# Patient Record
Sex: MALE | Race: OTHER | HISPANIC OR LATINO | Employment: FULL TIME | URBAN - METROPOLITAN AREA
[De-identification: names, ages, dates, MRNs, and addresses within clinical notes are randomized per-mention and may not be internally consistent; named-entity substitution may affect disease eponyms.]

---

## 2017-08-26 ENCOUNTER — HOSPITAL ENCOUNTER (EMERGENCY)
Facility: HOSPITAL | Age: 22
Discharge: HOME | End: 2017-08-26
Attending: EMERGENCY MEDICINE | Admitting: EMERGENCY MEDICINE
Payer: COMMERCIAL

## 2017-08-26 ENCOUNTER — APPOINTMENT (EMERGENCY)
Dept: RADIOLOGY | Facility: HOSPITAL | Age: 22
End: 2017-08-26
Payer: COMMERCIAL

## 2017-08-26 VITALS
OXYGEN SATURATION: 98 % | HEART RATE: 63 BPM | TEMPERATURE: 98.1 F | BODY MASS INDEX: 22.38 KG/M2 | SYSTOLIC BLOOD PRESSURE: 137 MMHG | RESPIRATION RATE: 18 BRPM | WEIGHT: 156 LBS | DIASTOLIC BLOOD PRESSURE: 62 MMHG

## 2017-08-26 DIAGNOSIS — M25.541 METACARPOPHALANGEAL JOINT PAIN OF RIGHT HAND: ICD-10-CM

## 2017-08-26 DIAGNOSIS — S69.91XA INJURY OF RIGHT HAND, INITIAL ENCOUNTER: Primary | ICD-10-CM

## 2017-08-26 DIAGNOSIS — S66.811A: ICD-10-CM

## 2017-08-26 PROCEDURE — 99283 EMERGENCY DEPT VISIT LOW MDM: CPT

## 2017-08-26 PROCEDURE — 73130 X-RAY EXAM OF HAND: CPT | Performed by: EMERGENCY MEDICINE

## 2017-08-26 PROCEDURE — 90715 TDAP VACCINE 7 YRS/> IM: CPT | Performed by: EMERGENCY MEDICINE

## 2017-08-26 PROCEDURE — 90471 IMMUNIZATION ADMIN: CPT

## 2017-08-26 RX ORDER — ACETAMINOPHEN 325 MG/1
650 TABLET ORAL ONCE
Status: COMPLETED | OUTPATIENT
Start: 2017-08-26 | End: 2017-08-26

## 2017-08-26 RX ORDER — IBUPROFEN 400 MG/1
400 TABLET ORAL ONCE
Status: COMPLETED | OUTPATIENT
Start: 2017-08-26 | End: 2017-08-26

## 2017-08-26 RX ADMIN — IBUPROFEN 400 MG: 400 TABLET, FILM COATED ORAL at 18:34

## 2017-08-26 RX ADMIN — ACETAMINOPHEN 650 MG: 325 TABLET, FILM COATED ORAL at 16:16

## 2017-08-26 RX ADMIN — TETANUS TOXOID, REDUCED DIPHTHERIA TOXOID AND ACELLULAR PERTUSSIS VACCINE, ADSORBED 0.5 ML: 5; 2.5; 8; 8; 2.5 SUSPENSION INTRAMUSCULAR at 18:33

## 2017-08-26 NOTE — ED PROVIDER NOTES
History  Chief Complaint   Patient presents with    Hand Injury     right hand pain; punched TV yesterday     17yo male presents to ED c/o right hand pain  Pt says he punched a tv around 2am and woke up with severe hand pain and swelling  Pt notes he is unable to make a tight fist or completely open his hand  Pt denies any wrist pain  Prior tetanus administration unknown  No pmhx  Current every day smoker, 2-3 cigs/day            None       History reviewed  No pertinent past medical history  Past Surgical History:   Procedure Laterality Date    APPENDECTOMY         History reviewed  No pertinent family history  I have reviewed and agree with the history as documented  Social History   Substance Use Topics    Smoking status: Current Every Day Smoker    Smokeless tobacco: Not on file    Alcohol use Yes      Comment: rare        Review of Systems   Constitutional: Negative for chills, diaphoresis and fever  Eyes: Negative for photophobia and visual disturbance  Respiratory: Negative for cough, chest tightness and shortness of breath  Cardiovascular: Negative for chest pain and palpitations  Gastrointestinal: Negative for abdominal pain, nausea and vomiting  Genitourinary: Negative for dysuria and frequency  Musculoskeletal: Positive for joint swelling  Negative for back pain  Swelling near 4th and 5th knuckles of right hand   Skin: Positive for color change  Negative for wound  Neurological: Negative for weakness, numbness and headaches  Hematological: Negative for adenopathy  Does not bruise/bleed easily  All other systems reviewed and are negative        Physical Exam  ED Triage Vitals [08/26/17 1613]   Temperature Pulse Respirations Blood Pressure SpO2   98 1 °F (36 7 °C) 63 18 137/62 98 %      Temp Source Heart Rate Source Patient Position BP Location FiO2 (%)   Tympanic -- -- -- --      Pain Score       3           Physical Exam   Constitutional: He is oriented to person, place, and time  He appears well-developed and well-nourished  No distress  HENT:   Head: Normocephalic and atraumatic  Eyes: EOM are normal  Pupils are equal, round, and reactive to light  Neck: Normal range of motion  Neck supple  Cardiovascular: Normal rate, regular rhythm, normal heart sounds and intact distal pulses  Pulmonary/Chest: Effort normal and breath sounds normal    Abdominal: Soft  Bowel sounds are normal    Musculoskeletal: He exhibits edema and tenderness  Bump with 3mm skin tear on 4th knuckle  Edema and slight erythema of 4th and 5th metacarpophalangeal dorsal region of right hand with  moderate tenderness on dorsal aspect on palpation   Decreased right hand strength d/t pain  Limited extensions and flexion or 4th and 5th digits  Unable to make wrist or extend hand fully d/t pain  Sensation and radial pulse intact   Neurological: He is alert and oriented to person, place, and time  Skin: Skin is warm and dry  Capillary refill takes less than 2 seconds  He is not diaphoretic  Psychiatric: He has a normal mood and affect  His behavior is normal  Judgment and thought content normal    Nursing note and vitals reviewed        ED Medications  Medications   acetaminophen (TYLENOL) tablet 650 mg (650 mg Oral Given 8/26/17 1616)   tetanus-diphtheria-acellular pertussis (BOOSTRIX) IM injection 0 5 mL (0 5 mL Intramuscular Given 8/26/17 1833)   ibuprofen (MOTRIN) tablet 400 mg (400 mg Oral Given 8/26/17 1834)       Diagnostic Studies  Labs Reviewed - No data to display    XR hand 3+ views RIGHT    (Results Pending)       Procedures  Procedures      Phone Consults  ED Phone Contact    ED Course  ED Course   Oscar lAex's Documentation   Comment Time   Ulnar gutter spint to right wrist/hand applied 08/26 1843                             [facility]  Number of Diagnoses or Management Options  Diagnosis management comments: Impression: 17yo male presents to ED for evaluation of right hand injury Ddx: boxer's fx, musculoskeletal pain   Plan: pain control, right hand, wrist Xr, splint    CritCare Time    Disposition  Final diagnoses:   Injury of right hand, initial encounter   Metacarpophalangeal joint pain of right hand   Strain, MCP, hand, right, initial encounter     ED Disposition     ED Disposition Condition Comment    Discharge  [facility] discharge to home/self care  Condition at discharge: Good        Follow-up Information     Follow up With Specialties Details Why Contact Info    Melissa Herzog MD Internal Medicine In 1 week As needed, If symptoms worsen [facility]  [address]          There are no discharge medications for this patient  No discharge procedures on file  ED Provider  Attending physically available and evaluated [facility]  I managed the patient along with the ED Attending      Electronically Signed by       Oniel Almanzar MD  Resident  08/27/17 5480

## 2017-08-26 NOTE — DISCHARGE INSTRUCTIONS
Finger Sprain   WHAT YOU NEED TO KNOW:   A finger sprain happens when ligaments in your finger or thumb are stretched or torn  Ligaments are the tough tissues that connect bones  Ligaments allow your hands to grasp and pinch  DISCHARGE INSTRUCTIONS:   Return to the emergency department if:   · The skin on your injured finger looks bluish or pale (less color than normal)  · You have new weakness or numbness in your finger or thumb  It may tingle or burn  · You have a splint that you cannot adjust and it feels too tight  Contact your healthcare provider if:   · You have new or increased swelling or pain in your finger  · You have new or increased stiffness when you move your injured finger  · You have questions or concerns about your injury or treatment  Medicines:   · Pain medicine  may be given  Do not wait until the pain is severe before taking your medicine  · Take your medicine as directed  Call your healthcare provider if you think your medicines are not helping or if you have side effects  Tell him if you take vitamins, herbs, or any other medicines  Keep a written list of your medicines  Include the amounts, and when and why you take them  Bring the list or the pill bottles to follow-up visits  Care for your finger:   · Rest  your finger for at least 48 hours  Do not do activities that cause pain  Return to normal activities as directed  · Apply ice  on your finger to help decrease pain and swelling  Put crushed ice in a plastic bag and cover it with a towel  Put the ice on your injured finger or thumb every hour for 15 to 20 minutes at a time  You may need to ice the area at least 4 to 8 times each day  Ice your finger for as many days as directed  · Elevate your finger  above the level of your heart as often as you can  This will help decrease swelling and pain  You can elevate your hand by resting it on a pillow  · Use a splint or compression as directed    Compression (tight hold) helps support your finger or thumb as it heals  Tape your injured finger to the finger beside it  Severe sprains may be treated with a splint  A splint prevents your finger from moving while it heals  Ask how long you must wear the splint or tape, and how to apply them  · Do exercises as directed  You may be given gentle exercises to begin in a few days  Exercises can help decrease stiffness in your finger or thumb  Exercises also help decrease pain and swelling and improve the movement of your finger or thumb  Check with your healthcare provider before you return to your normal activities or sports  Follow up with your healthcare provider as directed:  Write down any questions you may have to ask at your follow up visits  © 2017 [address] [employer] Information is for End User's use only and may not be sold, redistributed or otherwise used for commercial purposes  All illustrations and images included in CareNotes® are the copyrighted property of [employer]  or [employer]  The above information is an  only  It is not intended as medical advice for individual conditions or treatments  Talk to your doctor, nurse or pharmacist before following any medical regimen to see if it is safe and effective for you

## 2017-08-27 NOTE — ED PROCEDURE NOTE
Procedure  Static Splint Application  Date/Time: 8/26/2017 6:00 PM  Performed by: Mare Saavedra by: Vu Osei     Patient location:  Bedside  Procedure performed by emergency physician: Yes    Other Assisting Provider: No    Consent:     Consent obtained:  Verbal    Consent given by:  Patient    Risks discussed:  Discoloration, numbness, pain and swelling    Alternatives discussed:  No treatment  Universal protocol:     Procedure explained and questions answered to patient or proxy's satisfaction: yes      Imaging studies available: yes      Patient identity confirmed:  Verbally with patient and arm band  Indication:     Indications: sprain/strain    Pre-procedure details:     Sensation:  Normal  Procedure details:     Laterality:  Right    Location:  Hand    Hand:  R hand    Splint type:  Ulnar gutter    Supplies:  Ortho-Glass  Post-procedure details:     Pain:  Improved    Sensation:  Normal    Neurovascular Exam: capillary refill <2 sec      Patient tolerance of procedure:   Tolerated well, no immediate complications

## 2017-08-29 ENCOUNTER — HOSPITAL ENCOUNTER (EMERGENCY)
Facility: HOSPITAL | Age: 22
Discharge: HOME | End: 2017-08-29
Admitting: EMERGENCY MEDICINE
Payer: COMMERCIAL

## 2017-08-29 VITALS
WEIGHT: 156 LBS | OXYGEN SATURATION: 98 % | DIASTOLIC BLOOD PRESSURE: 76 MMHG | RESPIRATION RATE: 18 BRPM | TEMPERATURE: 98.8 F | SYSTOLIC BLOOD PRESSURE: 152 MMHG | HEART RATE: 65 BPM | HEIGHT: 65 IN | BODY MASS INDEX: 25.99 KG/M2

## 2017-08-29 DIAGNOSIS — L81.9 DISCOLORATION OF SKIN OF HAND: ICD-10-CM

## 2017-08-29 DIAGNOSIS — M79.641 RIGHT HAND PAIN: Primary | ICD-10-CM

## 2017-08-29 PROCEDURE — 99283 EMERGENCY DEPT VISIT LOW MDM: CPT

## 2017-09-06 NOTE — ED ATTENDING ATTESTATION
Nay Hillman MD, saw and evaluated the patient  I have discussed the patient with the resident/non-physician practitioner and agree with the resident's/non-physician practitioner's findings, Plan of Care, and MDM as documented in the resident's/non-physician practitioner's note, except where noted  All available labs and Radiology studies were reviewed  At this point I agree with the current assessment done in the Emergency Department  I have conducted an independent evaluation of this patient a history and physical is as follows:    Patient presents with R had pain after punching a TV last night  No additional injuries or complaints  Exam: AAOX3, NAD, R hand with TTP/swelling over 4th and 5th MCP  A/P: Hand injury  Will check xray to r/o fx      Critical Care Time  CritCare Time

## 2017-10-31 ENCOUNTER — HOSPITAL ENCOUNTER (EMERGENCY)
Facility: HOSPITAL | Age: 22
Discharge: HOME | End: 2017-10-31
Attending: EMERGENCY MEDICINE
Payer: COMMERCIAL

## 2017-10-31 VITALS
BODY MASS INDEX: 25.96 KG/M2 | SYSTOLIC BLOOD PRESSURE: 128 MMHG | WEIGHT: 156 LBS | TEMPERATURE: 102.9 F | DIASTOLIC BLOOD PRESSURE: 82 MMHG | OXYGEN SATURATION: 98 % | HEART RATE: 102 BPM | RESPIRATION RATE: 18 BRPM

## 2017-10-31 DIAGNOSIS — R50.9 FEVER: ICD-10-CM

## 2017-10-31 DIAGNOSIS — J02.0 STREP PHARYNGITIS: Primary | ICD-10-CM

## 2017-10-31 PROCEDURE — 99283 EMERGENCY DEPT VISIT LOW MDM: CPT

## 2017-10-31 RX ORDER — AMOXICILLIN 500 MG/1
500 CAPSULE ORAL EVERY 12 HOURS SCHEDULED
Qty: 20 CAPSULE | Refills: 0 | Status: SHIPPED | OUTPATIENT
Start: 2017-10-31 | End: 2017-11-10

## 2017-10-31 RX ORDER — ACETAMINOPHEN 325 MG/1
975 TABLET ORAL ONCE
Status: COMPLETED | OUTPATIENT
Start: 2017-10-31 | End: 2017-10-31

## 2017-10-31 RX ORDER — AMOXICILLIN 250 MG/1
500 CAPSULE ORAL ONCE
Status: COMPLETED | OUTPATIENT
Start: 2017-10-31 | End: 2017-10-31

## 2017-10-31 RX ADMIN — AMOXICILLIN 500 MG: 250 CAPSULE ORAL at 21:14

## 2017-10-31 RX ADMIN — ACETAMINOPHEN 975 MG: 325 TABLET, FILM COATED ORAL at 21:14

## 2017-11-01 NOTE — ED ATTENDING ATTESTATION
Mara Vaz MD, saw and evaluated the patient  I have discussed the patient with the resident/non-physician practitioner and agree with the resident's/non-physician practitioner's findings, Plan of Care, and MDM as documented in the resident's/non-physician practitioner's note, except where noted  All available labs and Radiology studies were reviewed  At this point I agree with the current assessment done in the Emergency Department  I have conducted an independent evaluation of this patient a history and physical is as follows: Sore throat, fevers, myalgias, noticed that he had pus on his tonsils and came in because he has a history of strep throat in the past   The patient is otherwise healthy with no immunosuppression  He denies abdominal pain, vomiting, diarrhea, shortness of breath, or chest pain  The patient does not have neck pain  His review of systems otherwise negative in 12 systems were reviewed  On exam the patient is febrile and tachycardic  On HEENT exam is exudate of tonsillitis which is symmetric  His oropharynx is otherwise clear  The patient does not have significant trismus  The floor of his mouth is soft  He does not have any abnormalities on neck exam other than some shoddy adenopathy, but no masses, tenderness, or fluctuance  The patient's heart is tachycardic with no murmurs, rubs, or gallops  His lungs are clear and equal with no wheezes, rales, rhonchi  Abdomen is benign  He is neurologically intact and his skin has no rashes  Impression:  Exudative pharyngitis    Will plan to treat with penicillin  Critical Care Time  CritCare Time

## 2017-11-01 NOTE — ED PROVIDER NOTES
History  Chief Complaint   Patient presents with    Sore Throat     pt reports throat and joint pain since sunday, states he thinks he has strep throat    Fever - 9 weeks to 74 years     Patient is a 73-year-old male who presents with sore throat x3 days  Patient reports that approximately 3 days ago he noticed that his throat was hurting him, it was more painful to swallow and he felt like his whole body was sore with joint pain  Also complains of fevers for which she has been taking Tylenol  Patient says that he has had a history of strep in the past and feels like this is strep again because he saw his tonsils and they looked white  Denies neck pain, cough  Assessment and plan:  Exudative pharyngitis  Will treat with amoxicillin  None       History reviewed  No pertinent past medical history  Past Surgical History:   Procedure Laterality Date    APPENDECTOMY         History reviewed  No pertinent family history  I have reviewed and agree with the history as documented  Social History   Substance Use Topics    Smoking status: Current Every Day Smoker     Packs/day: 0 20    Smokeless tobacco: Never Used    Alcohol use Yes      Comment: weekends        Review of Systems   Constitutional: Positive for chills and fever  HENT: Positive for sore throat  Negative for rhinorrhea  Eyes: Negative for discharge  Respiratory: Negative for cough and shortness of breath  Cardiovascular: Negative for chest pain and palpitations  Gastrointestinal: Negative for abdominal pain, constipation, diarrhea, nausea and vomiting  Genitourinary: Negative for dysuria and hematuria  Musculoskeletal: Negative for neck pain and neck stiffness  Skin: Negative for rash  Neurological: Negative for headaches  All other systems reviewed and are negative        Physical Exam  ED Triage Vitals [10/31/17 1942]   Temperature Pulse Respirations Blood Pressure SpO2   (!) 102 9 °F (39 4 °C) 102 18 128/82 98 %      Temp Source Heart Rate Source Patient Position - Orthostatic VS BP Location FiO2 (%)   Oral -- -- -- --      Pain Score       --           Orthostatic Vital Signs  Vitals:    10/31/17 1942   BP: 128/82   Pulse: 102       Physical Exam   Constitutional: He is oriented to person, place, and time  He appears well-developed and well-nourished  No distress  HENT:   Head: Normocephalic and atraumatic  Right Ear: Tympanic membrane and external ear normal    Left Ear: Tympanic membrane and external ear normal    Nose: Nose normal    Mouth/Throat: Mucous membranes are normal  No trismus in the jaw  No uvula swelling  Posterior oropharyngeal erythema present  No oropharyngeal exudate or tonsillar abscesses  Tonsils are 2+ on the right  Tonsils are 2+ on the left  Tonsillar exudate ( right tonsil)  Eyes: Conjunctivae and EOM are normal  Pupils are equal, round, and reactive to light  Neck: Normal range of motion and full passive range of motion without pain  Neck supple  No spinous process tenderness and no muscular tenderness present  No neck rigidity  No edema, no erythema and normal range of motion present  No Brudzinski's sign and no Kernig's sign noted  Cardiovascular: Normal rate, regular rhythm, normal heart sounds and intact distal pulses  Exam reveals no gallop and no friction rub  No murmur heard  Pulmonary/Chest: Effort normal and breath sounds normal  No respiratory distress  He has no wheezes  He has no rales  He exhibits no tenderness  Abdominal: Soft  Bowel sounds are normal  He exhibits no distension  There is no tenderness  Musculoskeletal: Normal range of motion  He exhibits no edema or tenderness  Neurological: He is alert and oriented to person, place, and time  Moves all 4 extremities    Skin: Skin is warm and dry  Capillary refill takes less than 2 seconds  He is not diaphoretic  No erythema  Psychiatric: He has a normal mood and affect   His behavior is normal  Nursing note and vitals reviewed  ED Medications  Medications   acetaminophen (TYLENOL) tablet 975 mg (975 mg Oral Given 10/31/17 2114)   amoxicillin (AMOXIL) capsule 500 mg (500 mg Oral Given 10/31/17 2114)       Diagnostic Studies  Results Reviewed     None                 No orders to display         Procedures  Procedures      Phone Consults  ED Phone Contact    ED Course  ED Course                                MDM  CritCare Time    Disposition  Final diagnoses:   Strep pharyngitis   Fever     Time reflects when diagnosis was documented in both MDM as applicable and the Disposition within this note     Time User Action Codes Description Comment    10/31/2017  9:09 PM Ambrose Montaño Add [J02 0] Strep pharyngitis     10/31/2017  9:09 PM [address] [R50 9] Fever       ED Disposition     ED Disposition Condition Comment    Discharge  [facility] discharge to home/self care  Condition at discharge: Good        Follow-up Information     Follow up With Specialties Details Why Contact Info Additional Information    Luz Mike MD Internal Medicine Schedule an appointment as soon as possible for a visit in 3 days for re-evaluation  Fer   [id number] [facility] [address] [facility] Emergency Department Emergency Medicine Go to for re-evaluation, As needed, If symptoms worsen [address] [place] [address], [address] [place] [address], [id number]        Discharge Medication List as of 10/31/2017  9:11 PM      START taking these medications    Details   amoxicillin (AMOXIL) 500 mg capsule Take 1 capsule by mouth every 12 (twelve) hours for 10 days, Starting Tue 10/31/2017, Until Fri 11/10/2017, Print           No discharge procedures on file  ED Provider  Attending physically available and evaluated [facility]  I managed the patient along with the ED Attending      Electronically Signed by Kodak Orta, DO  Resident  11/01/17 3629

## 2017-11-01 NOTE — DISCHARGE INSTRUCTIONS
Strep Throat   WHAT YOU NEED TO KNOW:   Strep throat is a throat infection caused by bacteria  It is easily spread from person to person  DISCHARGE INSTRUCTIONS:   Call 911 for any of the following:   · You have trouble breathing  Return to the emergency department if:   · You have new symptoms like a bad headache, stiff neck, chest pain, or vomiting  · You are drooling because you cannot swallow your spit  Contact your healthcare provider if:   · You have a fever  · You have a rash or ear pain  · You have green, yellow-brown, or bloody mucus when you cough or blow your nose  · You are unable to drink anything  · You have questions or concerns about your condition or care  Medicines:   · Antibiotics  help treat your strep throat  You should feel better within 2 to 3 days after you start antibiotics  · Take your medicine as directed  Contact your healthcare provider if you think your medicine is not helping or if you have side effects  Tell him or her if you are allergic to any medicine  Keep a list of the medicines, vitamins, and herbs you take  Include the amounts, and when and why you take them  Bring the list or the pill bottles to follow-up visits  Carry your medicine list with you in case of an emergency  Manage your symptoms:   · Use lozenges, ice, soft foods, or popsicles  to soothe your throat  · Drink juice, milk shakes, or soup  if your throat is too sore to eat solid food  Drinking liquids can also help prevent dehydration  · Gargle with salt water  Mix ¼ teaspoon salt in a glass of warm water and gargle  This may help reduce swelling in your throat  · Do not smoke  Nicotine and other chemicals in cigarettes and cigars can cause lung damage and make your symptoms worse  Ask your healthcare provider for information if you currently smoke and need help to quit  E-cigarettes or smokeless tobacco still contain nicotine   Talk to your healthcare provider before you use these products  Return to work or school  24 hours after you start antibiotic medicine  Prevent the spread of strep throat:   · Wash your hands often  Use soap and water  Wash your hands after you use the bathroom, change a child's diapers, or sneeze  Wash your hands before you prepare or eat food  · Do not share food or drinks  Replace your toothbrush after you have taken antibiotics for 24 hours  Follow up with your healthcare provider as directed:  Write down your questions so you remember to ask them during your visits  © 2017 [employer] Information is for End User's use only and may not be sold, redistributed or otherwise used for commercial purposes  All illustrations and images included in CareNotes® are the copyrighted property of [employer]  or [employer]  The above information is an  only  It is not intended as medical advice for individual conditions or treatments  Talk to your doctor, nurse or pharmacist before following any medical regimen to see if it is safe and effective for you  Fever in Adults   WHAT YOU NEED TO KNOW:   A fever is an increase in your body temperature  Normal body temperature is 98 6°F (37°C)  Fever is generally defined as greater than 100 4°F (38°C)  Common causes include an infection, injury, or disease such as arthritis  DISCHARGE INSTRUCTIONS:   Return to the emergency department if:   · Your fever does not go away or gets worse even after treatment  · You have a stiff neck and a bad headache  · You are confused  You may not be able to think clearly or remember things like you normally do  · Your heart beats faster than usual even after treatment  · You have shortness of breath or chest pain when you breathe  · You urinate small amounts or not at all  · Your skin, lips, or nails turn blue  Contact your healthcare provider if:   · You have abdominal pain or you feel bloated      · You have nausea or are vomiting  · You have pain or burning when you urinate, or you have pain in your back  · You have questions or concerns about your condition or care  Medicines: You may need any of the following:  · NSAIDs , such as ibuprofen, help decrease swelling, pain, and fever  This medicine is available with or without a doctor's order  NSAIDs can cause stomach bleeding or kidney problems in certain people  If you take blood thinner medicine, always ask if NSAIDs are safe for you  Always read the medicine label and follow directions  Do not give these medicines to children under 10months of age without direction from your child's healthcare provider  · Acetaminophen  decreases pain and fever  It is available without a doctor's order  Ask how much to take and how often to take it  Follow directions  Read the labels of all other medicines you are using to see if they also contain acetaminophen, or ask your doctor or pharmacist  Acetaminophen can cause liver damage if not taken correctly  Do not use more than 4 grams (4,000 milligrams) total of acetaminophen in one day  · Antibiotics  may be given if you have an infection caused by bacteria  · Take your medicine as directed  Contact your healthcare provider if you think your medicine is not helping or if you have side effects  Tell him of her if you are allergic to any medicine  Keep a list of the medicines, vitamins, and herbs you take  Include the amounts, and when and why you take them  Bring the list or the pill bottles to follow-up visits  Carry your medicine list with you in case of an emergency  Follow up with your healthcare provider as directed:  Write down your questions so you remember to ask them during your visits  Self-care:   · Drink more liquids as directed  A fever makes you sweat  This can increase your risk for dehydration  Liquids can help prevent dehydration       ¨ Drink at least 6 to 8 eight-ounce cups of clear liquids each day  Drink water, juice, or broth  Do not drink sports drinks  They may contain caffeine  ¨ Ask your healthcare provider if you should drink an oral rehydration solution (ORS)  An ORS has the right amounts of water, salts, and sugar you need to replace body fluids  · Dress in lightweight clothes  Shivers may be a sign that your fever is rising  Do not put extra blankets or clothes on  This may cause your fever to rise even higher  Dress in light, comfortable clothing  Use a lightweight blanket or sheet when you sleep  Change your clothes, blanket, or sheets if they get wet  · Cool yourself safely  Take a bath in cool or lukewarm water  Use an ice pack wrapped in a small towel or wet a washcloth with cool water  Place the ice pack or wet washcloth on your forehead or the back of your neck  © 2017 [facility] Information is for End User's use only and may not be sold, redistributed or otherwise used for commercial purposes  All illustrations and images included in CareNotes® are the copyrighted property of [employer]  or [address]  The above information is an  only  It is not intended as medical advice for individual conditions or treatments  Talk to your doctor, nurse or pharmacist before following any medical regimen to see if it is safe and effective for you

## 2017-12-27 ENCOUNTER — HOSPITAL ENCOUNTER (EMERGENCY)
Facility: HOSPITAL | Age: 22
Discharge: HOME | End: 2017-12-27
Admitting: EMERGENCY MEDICINE
Payer: COMMERCIAL

## 2017-12-27 ENCOUNTER — APPOINTMENT (EMERGENCY)
Dept: RADIOLOGY | Facility: HOSPITAL | Age: 22
End: 2017-12-27
Payer: COMMERCIAL

## 2017-12-27 VITALS
DIASTOLIC BLOOD PRESSURE: 69 MMHG | TEMPERATURE: 98.6 F | WEIGHT: 156.09 LBS | RESPIRATION RATE: 18 BRPM | BODY MASS INDEX: 25.97 KG/M2 | SYSTOLIC BLOOD PRESSURE: 161 MMHG | OXYGEN SATURATION: 98 % | HEART RATE: 79 BPM

## 2017-12-27 DIAGNOSIS — M54.6 ACUTE THORACIC BACK PAIN: Primary | ICD-10-CM

## 2017-12-27 PROCEDURE — 96372 THER/PROPH/DIAG INJ SC/IM: CPT

## 2017-12-27 PROCEDURE — 71020 HB CHEST X-RAY 2VW FRONTAL&LATL: CPT

## 2017-12-27 PROCEDURE — 99283 EMERGENCY DEPT VISIT LOW MDM: CPT

## 2017-12-27 RX ORDER — KETOROLAC TROMETHAMINE 30 MG/ML
30 INJECTION, SOLUTION INTRAMUSCULAR; INTRAVENOUS ONCE
Status: COMPLETED | OUTPATIENT
Start: 2017-12-27 | End: 2017-12-27

## 2017-12-27 RX ORDER — NAPROXEN 500 MG/1
500 TABLET ORAL 2 TIMES DAILY WITH MEALS
Qty: 14 TABLET | Refills: 0 | Status: SHIPPED | OUTPATIENT
Start: 2017-12-27 | End: 2020-01-16

## 2017-12-27 RX ORDER — DIAZEPAM 5 MG/1
5 TABLET ORAL ONCE
Status: COMPLETED | OUTPATIENT
Start: 2017-12-27 | End: 2017-12-27

## 2017-12-27 RX ORDER — DIAZEPAM 5 MG/1
5 TABLET ORAL EVERY 8 HOURS PRN
Qty: 9 TABLET | Refills: 0 | Status: SHIPPED | OUTPATIENT
Start: 2017-12-27 | End: 2020-01-16

## 2017-12-27 RX ADMIN — DIAZEPAM 5 MG: 5 TABLET ORAL at 16:51

## 2017-12-27 RX ADMIN — KETOROLAC TROMETHAMINE 30 MG: 30 INJECTION, SOLUTION INTRAMUSCULAR at 16:51

## 2017-12-27 NOTE — ED PROVIDER NOTES
History  Chief Complaint   Patient presents with    Back Pain     pt wtith mid to lower back pain started approx 1 hr ago      68-year-old male presents emergency room for evaluation upper back pain  Onset about an hour and half ago while at rest   Described as a slow progressive tightness or muscle spasm  Patient tried laying down without any relief  He denies injury or lifting  Pain does not radiate  States when he is standing and talking pain increases  Pain is also worse with taking a deep breath  Denies shortness of breath or chest pain  Patient states he is otherwise healthy denies other medical problems  History provided by:  Patient  Back Pain   Associated symptoms: no chest pain, no fever, no numbness and no weakness        None       History reviewed  No pertinent past medical history  Past Surgical History:   Procedure Laterality Date    APPENDECTOMY         History reviewed  No pertinent family history  I have reviewed and agree with the history as documented  Social History   Substance Use Topics    Smoking status: Current Every Day Smoker     Packs/day: 0 20    Smokeless tobacco: Never Used    Alcohol use Yes      Comment: weekends        Review of Systems   Constitutional: Negative for chills and fever  Respiratory: Negative for shortness of breath  Cardiovascular: Negative for chest pain  Musculoskeletal: Positive for back pain  Skin: Negative for rash and wound  Neurological: Negative for weakness and numbness         Physical Exam  ED Triage Vitals [12/27/17 1636]   Temperature Pulse Respirations Blood Pressure SpO2   98 6 °F (37 °C) 79 18 161/69 98 %      Temp Source Heart Rate Source Patient Position - Orthostatic VS BP Location FiO2 (%)   Oral Monitor -- -- --      Pain Score       Worst Possible Pain           Orthostatic Vital Signs  Vitals:    12/27/17 1636   BP: 161/69   Pulse: 79       Physical Exam   Constitutional: He appears well-developed and well-nourished  Cardiovascular: Normal rate, regular rhythm and normal heart sounds  Pulmonary/Chest: Effort normal and breath sounds normal    Musculoskeletal:        Cervical back: Normal         Thoracic back: He exhibits decreased range of motion, tenderness and bony tenderness  He exhibits no swelling and no deformity  Lumbar back: Normal         Back:    Normal  strength in hands and FROM of arms   Skin: Skin is warm and dry  Nursing note and vitals reviewed        ED Medications  Medications   ketorolac (TORADOL) injection 30 mg (30 mg Intramuscular Given 12/27/17 1651)   diazepam (VALIUM) tablet 5 mg (5 mg Oral Given 12/27/17 1651)       Diagnostic Studies  Results Reviewed     None                 XR chest 2 views   ED Interpretation by Conchita Payne PA-C (12/27 1702)   NO acute disease                 Procedures  Procedures       Phone Contacts  ED Phone Contact    ED Course  ED Course                PERC Rule for PE    Flowsheet Row Most Recent Value   PERC Rule for PE   Age >=50  0 Filed at: 12/27/2017 1646   HR >=100  0 Filed at: 12/27/2017 1646   O2 Sat on room air < 95%  0 Filed at: 12/27/2017 1646   History of PE or DVT  0 Filed at: 12/27/2017 1646   Recent trauma or surgery  0 Filed at: 12/27/2017 1646   Hemoptysis  0 Filed at: 12/27/2017 1646   Exogenous estrogen  0 Filed at: 12/27/2017 1646   Unilateral leg swelling  0 Filed at: 12/27/2017 1646   PERC Rule for PE Results  0 Filed at: 12/27/2017 1646                Wells' Criteria for PE    Flowsheet Row Most Recent Value   Wells' Criteria for PE   Clinical signs and symptoms of DVT  0 Filed at: 12/27/2017 1646   PE is primary diagnosis or equally likely  0 Filed at: 12/27/2017 1646   HR >100  0 Filed at: 12/27/2017 1646   Immobilization at least 3 days or Surgery in the previous 4 weeks  0 Filed at: 12/27/2017 1646   Previous, objectively diagnosed PE or DVT  0 Filed at: 12/27/2017 1646   Hemoptysis  0 Filed at: 12/27/2017 1646 Malignancy with treatment within 6 months or palliative  0 Filed at: 12/27/2017 1646   Wells' Criteria Total  0 Filed at: 12/27/2017 1646            [facility]  Number of Diagnoses or Management Options  Acute thoracic back pain:   Risk of Complications, Morbidity, and/or Mortality  General comments: Differential diagnosis includes but is not limited to: muscle spasm/strain/sprain, unlikely cardiac or pulmonary    Patient Progress  Patient progress: stable    CritCare Time    Disposition  Final diagnoses:   Acute thoracic back pain     Time reflects when diagnosis was documented in both MDM as applicable and the Disposition within this note     Time User Action Codes Description Comment    12/27/2017  5:12 PM [facility] Add [M54 6] Acute thoracic back pain       ED Disposition     ED Disposition Condition Comment    Discharge  [facility] discharge to home/self care  Condition at discharge: Good        Follow-up Information     Follow up With Specialties Details Why Contact Info Additional Information    Bjorn Bedolla MD Internal Medicine In 3 days  [facility]  [address] [address]  [id number] [place] [address] Emergency Department Emergency Medicine  If symptoms worsen [address]  [phone]  ED, [address] [place] [address], [facility]        Patient's Medications   Discharge Prescriptions    DIAZEPAM (VALIUM) 5 MG TABLET    Take 1 tablet by mouth every 8 (eight) hours as needed for muscle spasms for up to 3 days       Start Date: 12/27/2017End Date: 12/30/2017       Order Dose: 5 mg       Quantity: 9 tablet    Refills: 0    NAPROXEN (EC NAPROSYN) 500 MG EC TABLET    Take 1 tablet by mouth 2 (two) times a day with meals for 7 days       Start Date: 12/27/2017End Date: 1/3/2018       Order Dose: 500 mg       Quantity: 14 tablet    Refills: 0     No discharge procedures on file      ED Provider  Electronically Signed by Alvarado Marshall PA-C  12/27/17 173

## 2017-12-27 NOTE — DISCHARGE INSTRUCTIONS
Back Pain   WHAT YOU NEED TO KNOW:   Back pain is common  It can be caused by many conditions, such as arthritis or the breakdown of spinal discs  Your risk for back pain is increased by injuries, lack of activity, or repeated bending and twisting  You may feel sore or stiff on one or both sides of your back  The pain may spread to your buttocks or thighs  DISCHARGE INSTRUCTIONS:   Medicines:   · NSAIDs  help decrease swelling and pain  This medicine is available with or without a doctor's order  NSAIDs can cause stomach bleeding or kidney problems in certain people  If you take blood thinner medicine, always ask your healthcare provider if NSAIDs are safe for you  Always read the medicine label and follow directions  · Acetaminophen  decreases pain  It is available without a doctor's order  Ask how much to take and how often to take it  Follow directions  Acetaminophen can cause liver damage if not taken correctly  · Prescription pain medicine  may be given  Ask your healthcare provider how to take this medicine safely  · Take your medicine as directed  Contact your healthcare provider if you think your medicine is not helping or if you have side effects  Tell him or her if you are allergic to any medicine  Keep a list of the medicines, vitamins, and herbs you take  Include the amounts, and when and why you take them  Bring the list or the pill bottles to follow-up visits  Carry your medicine list with you in case of an emergency  Follow up with your healthcare provider in 2 weeks, or as directed:  Write down your questions so you remember to ask them during your visits  How to manage your back pain:   · Apply ice  on your back or affected area for 15 to 20 minutes every hour or as directed  Use an ice pack, or put crushed ice in a plastic bag  Cover it with a towel  Ice helps prevent tissue damage and decreases pain      · Apply heat  on your back or affected area for 20 to 30 minutes every 2 hours for as many days as directed  Heat helps decrease pain and muscle spasms  · Stay active  as much as you can without causing more pain  Bed rest could make your back pain worse  Avoid heavy lifting until your pain is gone  Return to the emergency department if:   · You have pain, numbness, or weakness in one or both legs  · Your pain becomes so severe that you cannot walk  · You cannot control your urine or bowel movements  · You have severe back pain with chest pain  · You have severe back pain, nausea, and vomiting  · You have severe back pain that spreads to your side or genital area  Contact your healthcare provider if:   · You have back pain that does not get better with rest and pain medicine  · You have a fever  · You have pain that worsens when you are on your back or when you rest     · You have pain that worsens when you cough or sneeze  · You lose weight without trying  · You have questions or concerns about your condition or care  © 2017 [address] Information is for End User's use only and may not be sold, redistributed or otherwise used for commercial purposes  All illustrations and images included in CareNotes® are the copyrighted property of [employer]  or [employer]  The above information is an  only  It is not intended as medical advice for individual conditions or treatments  Talk to your doctor, nurse or pharmacist before following any medical regimen to see if it is safe and effective for you

## 2020-01-16 ENCOUNTER — HOSPITAL ENCOUNTER (EMERGENCY)
Facility: HOSPITAL | Age: 25
Discharge: HOME | End: 2020-01-16
Attending: EMERGENCY MEDICINE

## 2020-01-16 VITALS
TEMPERATURE: 98.2 F | OXYGEN SATURATION: 97 % | HEART RATE: 62 BPM | DIASTOLIC BLOOD PRESSURE: 63 MMHG | SYSTOLIC BLOOD PRESSURE: 117 MMHG | RESPIRATION RATE: 16 BRPM | BODY MASS INDEX: 25.71 KG/M2 | WEIGHT: 160 LBS | HEIGHT: 66 IN

## 2020-01-16 DIAGNOSIS — J20.9 ACUTE BRONCHITIS: Primary | ICD-10-CM

## 2020-01-16 PROCEDURE — 99283 EMERGENCY DEPT VISIT LOW MDM: CPT

## 2020-01-16 PROCEDURE — 99284 EMERGENCY DEPT VISIT MOD MDM: CPT | Performed by: PHYSICIAN ASSISTANT

## 2020-01-16 RX ORDER — AZITHROMYCIN 250 MG/1
250 TABLET, FILM COATED ORAL DAILY
Qty: 6 TABLET | Refills: 0 | Status: SHIPPED | OUTPATIENT
Start: 2020-01-16 | End: 2020-01-21

## 2020-01-16 RX ORDER — DEXTROMETHORPHAN HYDROBROMIDE AND PROMETHAZINE HYDROCHLORIDE 15; 6.25 MG/5ML; MG/5ML
5 SOLUTION ORAL 4 TIMES DAILY PRN
Qty: 118 ML | Refills: 0 | Status: SHIPPED | OUTPATIENT
Start: 2020-01-16 | End: 2020-01-23

## 2020-01-16 NOTE — ED PROVIDER NOTES
History  Chief Complaint   Patient presents with    Vomiting     Patient states that he has been nauseous for the past week and states that today he started feeling hot  States that last week his kids were here for the flu and strep  Wondering if hes getting it as well  This is a 20-year-old male patient who smoke cigarettes  For the last 4 days he has had a productive cough that keeps him up at night  He coughs almost he vomits  He also becomes nauseous walk coughing  Kids were diagnosed with the flu and strep does not have a sore throat  He has no headache no fevers mild body aches no chest pain or shortness of breath no congestion  Mild nausea no vomiting no diarrhea no abdominal pain no urinary symptoms nothing makes it better or worse  He states he had this in the past him a diagnosis of bronchitis in the feels the same  Has tried nothing over-the-counter  None       History reviewed  No pertinent past medical history  Past Surgical History:   Procedure Laterality Date    APPENDECTOMY         History reviewed  No pertinent family history  I have reviewed and agree with the history as documented  Social History     Tobacco Use    Smoking status: Current Every Day Smoker     Packs/day: 0 20    Smokeless tobacco: Never Used   Substance Use Topics    Alcohol use: Not Currently     Comment: weekends    Drug use: No        Review of Systems   All other systems reviewed and are negative  Physical Exam  Physical Exam   Constitutional: He appears well-developed and well-nourished  HENT:   Head: Normocephalic and atraumatic  Right Ear: External ear normal    Left Ear: External ear normal    Nose: Nose normal    Mouth/Throat: Oropharynx is clear and moist    Eyes: Pupils are equal, round, and reactive to light  Conjunctivae are normal    Neck: Normal range of motion  Neck supple  Cardiovascular: Normal rate and regular rhythm     Pulmonary/Chest: Effort normal    Mild rhonchi that clears with cough   Abdominal: Soft  Bowel sounds are normal  There is no tenderness  Neurological: He is alert  Skin: Skin is warm  Psychiatric: He has a normal mood and affect  His behavior is normal    Nursing note and vitals reviewed  Vital Signs  ED Triage Vitals [01/16/20 0840]   Temperature Pulse Respirations Blood Pressure SpO2   98 2 °F (36 8 °C) 62 16 117/63 97 %      Temp Source Heart Rate Source Patient Position - Orthostatic VS BP Location FiO2 (%)   Oral Monitor -- Left arm --      Pain Score       6           Vitals:    01/16/20 0840   BP: 117/63   Pulse: 62         Visual Acuity      ED Medications  Medications - No data to display    Diagnostic Studies  Results Reviewed     None                 No orders to display              Procedures  Procedures         ED Course                               MDM      Disposition  Final diagnoses:   Acute bronchitis     Time reflects when diagnosis was documented in both MDM as applicable and the Disposition within this note     Time User Action Codes Description Comment    1/16/2020  9:01 AM [place], [address] Add [J20 9] Acute bronchitis       ED Disposition     ED Disposition Condition Date/Time Comment    Discharge Stable u Jan 16, 2020  9:00 AM Gian Knox discharge to home/self care              Follow-up Information     Follow up With Specialties Details Why MD Rolando [address]  [phone]            Patient's Medications   Discharge Prescriptions    AZITHROMYCIN (ZITHROMAX) 250 MG TABLET    Take 1 tablet (250 mg total) by mouth daily for 5 days 2 po day 1 then the 1 po day 2-5       Start Date: 1/16/2020 End Date: 1/21/2020       Order Dose: 250 mg       Quantity: 6 tablet    Refills: 0    PROMETHAZINE-DM (PHENERGAN-DM) 6 25-15 MG/5 ML ORAL SYRUP    Take 5 mL by mouth 4 (four) times a day as needed for cough for up to 7 days       Start Date: 1/16/2020 End Date: 1/23/2020       Order Dose: 5 mL       Quantity: 118 mL    Refills: 0     No discharge procedures on file      ED Provider  Electronically Signed by           Stephie Mcmahon PA-C  01/16/20 8875

## 2020-03-08 ENCOUNTER — HOSPITAL ENCOUNTER (EMERGENCY)
Facility: HOSPITAL | Age: 25
Discharge: HOME | End: 2020-03-08
Attending: EMERGENCY MEDICINE

## 2020-03-08 VITALS
RESPIRATION RATE: 20 BRPM | TEMPERATURE: 98.7 F | HEART RATE: 75 BPM | SYSTOLIC BLOOD PRESSURE: 142 MMHG | OXYGEN SATURATION: 98 % | DIASTOLIC BLOOD PRESSURE: 76 MMHG

## 2020-03-08 DIAGNOSIS — M62.838 NECK MUSCLE SPASM: ICD-10-CM

## 2020-03-08 DIAGNOSIS — M54.2 NECK PAIN: Primary | ICD-10-CM

## 2020-03-08 PROCEDURE — 99283 EMERGENCY DEPT VISIT LOW MDM: CPT

## 2020-03-08 PROCEDURE — 99284 EMERGENCY DEPT VISIT MOD MDM: CPT | Performed by: EMERGENCY MEDICINE

## 2020-03-08 RX ORDER — ACETAMINOPHEN 325 MG/1
975 TABLET ORAL ONCE
Status: COMPLETED | OUTPATIENT
Start: 2020-03-08 | End: 2020-03-08

## 2020-03-08 RX ORDER — METHOCARBAMOL 500 MG/1
1000 TABLET, FILM COATED ORAL ONCE
Status: COMPLETED | OUTPATIENT
Start: 2020-03-08 | End: 2020-03-08

## 2020-03-08 RX ORDER — IBUPROFEN 400 MG/1
400 TABLET ORAL ONCE
Status: COMPLETED | OUTPATIENT
Start: 2020-03-08 | End: 2020-03-08

## 2020-03-08 RX ORDER — LIDOCAINE 50 MG/G
1 PATCH TOPICAL ONCE
Status: DISCONTINUED | OUTPATIENT
Start: 2020-03-08 | End: 2020-03-08 | Stop reason: HOSPADM

## 2020-03-08 RX ORDER — METHOCARBAMOL 500 MG/1
1500 TABLET, FILM COATED ORAL 3 TIMES DAILY
Qty: 20 TABLET | Refills: 0 | Status: SHIPPED | OUTPATIENT
Start: 2020-03-08

## 2020-03-08 RX ADMIN — METHOCARBAMOL 1000 MG: 500 TABLET, FILM COATED ORAL at 19:58

## 2020-03-08 RX ADMIN — ACETAMINOPHEN 975 MG: 325 TABLET ORAL at 19:58

## 2020-03-08 RX ADMIN — LIDOCAINE 1 PATCH: 50 PATCH TOPICAL at 19:57

## 2020-03-08 RX ADMIN — IBUPROFEN 400 MG: 400 TABLET ORAL at 19:58

## 2020-03-08 NOTE — ED PROVIDER NOTES
History  Chief Complaint   Patient presents with    Neck Pain     helping move furniture on sunday, reports R sided neck pain that radiates down his back starting today      17-year-old male with no past medical history who presents after 3 days of right neck pain after helping family member move furniture  Describes that he was attempting to lift a couch when he had a sharp pain over the back of his right neck and right trapezius  Says that it radiates from his right neck down between his shoulder blades  Says that he gets the pain moving his head to the right  Does not describe any numbness, weakness, tingling in the right upper extremity  He describes that the pain poorly responded 300 mg of Advil that he took earlier in the day  ROS is otherwise negative  None       History reviewed  No pertinent past medical history  Past Surgical History:   Procedure Laterality Date    APPENDECTOMY         History reviewed  No pertinent family history  I have reviewed and agree with the history as documented  E-Cigarette/Vaping    E-Cigarette Use Never User      E-Cigarette/Vaping Substances     Social History     Tobacco Use    Smoking status: Current Every Day Smoker     Packs/day: 0 20    Smokeless tobacco: Never Used   Substance Use Topics    Alcohol use: Not Currently     Comment: weekends    Drug use: No        Review of Systems   Constitutional: Negative for chills, diaphoresis, fatigue and fever  HENT: Negative for congestion and sore throat  Eyes: Negative for visual disturbance  Respiratory: Negative for cough, chest tightness and shortness of breath  Cardiovascular: Negative for chest pain, palpitations and leg swelling  Gastrointestinal: Negative for abdominal distention, abdominal pain, constipation, diarrhea, nausea and vomiting  Genitourinary: Negative for difficulty urinating and dysuria  Musculoskeletal: Positive for myalgias and neck pain  Negative for arthralgias  Skin: Negative for rash  Neurological: Negative for dizziness, weakness, light-headedness, numbness and headaches  Psychiatric/Behavioral: Negative for agitation, behavioral problems and confusion  The patient is not nervous/anxious  All other systems reviewed and are negative  Physical Exam  ED Triage Vitals   Temperature Pulse Respirations Blood Pressure SpO2   03/08/20 1836 03/08/20 1837 03/08/20 1837 03/08/20 1837 03/08/20 1837   98 7 °F (37 1 °C) 75 20 142/76 98 %      Temp src Heart Rate Source Patient Position - Orthostatic VS BP Location FiO2 (%)   -- -- -- -- --             Pain Score       03/08/20 1837       8             Orthostatic Vital Signs  Vitals:    03/08/20 1837   BP: 142/76   Pulse: 75       Physical Exam   Constitutional: He is oriented to person, place, and time  He appears well-developed and well-nourished  HENT:   Head: Normocephalic and atraumatic  Eyes: EOM are normal    Cardiovascular: Normal rate, regular rhythm and normal heart sounds  No murmur heard  Pulmonary/Chest: Effort normal and breath sounds normal  No respiratory distress  Abdominal: Soft  Bowel sounds are normal  He exhibits no distension  There is no tenderness  Musculoskeletal: He exhibits no deformity  Patient has pain moving his head to the right and with lateral rotation of the neck to the right  He is tender to palpation the posterior right neck and down the trapezius to between the right shoulder blade  Has normal sensation in the right upper extremity and normal strength  Neurological: He is alert and oriented to person, place, and time  Skin: Skin is warm  No rash noted  Psychiatric: He has a normal mood and affect   His behavior is normal  Judgment and thought content normal        ED Medications  Medications   lidocaine (LIDODERM) 5 % patch 1 patch (1 patch Topical Medication Applied 3/8/20 1957)   methocarbamol (ROBAXIN) tablet 1,000 mg (1,000 mg Oral Given 3/8/20 1958) acetaminophen (TYLENOL) tablet 975 mg (975 mg Oral Given 3/8/20 1958)   ibuprofen (MOTRIN) tablet 400 mg (400 mg Oral Given 3/8/20 1958)       Diagnostic Studies  Results Reviewed     None                 No orders to display         Procedures  Procedures      ED Course                               MDM  Number of Diagnoses or Management Options  Neck muscle spasm:   Neck pain:   Diagnosis management comments: Patient's presentation is concerning for muscular strain of the trapezius  Patient has no symptoms suggestive of neurologic dysfunction  Patient was started on Tylenol, ibuprofen, Robaxin, Lidoderm patch in the emergency department  This resulted in improvement in his symptoms  He was then discharged with a script for Robaxin and advised to continue his home Tylenol and ibuprofen as needed, and good purchase Lidoderm patches over-the-counter at local pharmacy  Patient given strict return precautions and agreeable with plan and discharged  Disposition  Final diagnoses:   Neck pain   Neck muscle spasm     Time reflects when diagnosis was documented in both MDM as applicable and the Disposition within this note     Time User Action Codes Description Comment    3/8/2020  8:24 PM Karole Patricia Add [M54 2] Neck pain     3/8/2020  8:24 PM Karole Patricia Add [U49 955] Neck muscle spasm       ED Disposition     ED Disposition Condition Date/Time Comment    Discharge Stable Sun Mar 8, 2020  8:24 PM Nito Robins discharge to home/self care              Follow-up Information     Follow up With Specialties Details Why Contact Info    Galo Fisher MD Family Medicine  As needed [address] [place] [address]   [phone]            Discharge Medication List as of 3/8/2020  8:27 PM      START taking these medications    Details   methocarbamol (ROBAXIN) 500 mg tablet Take 3 tablets (1,500 mg total) by mouth 3 (three) times a day, Starting Sun 3/8/2020, Normal           No discharge procedures on file  PDMP Review     None           ED Provider  Attending physically available and evaluated Daisycaden Duane I managed the patient along with the ED Attending      Electronically Signed by         Cristina Drew MD  03/08/20 3979

## 2020-03-09 NOTE — DISCHARGE INSTRUCTIONS
Continue home tylenol and advil as directions allow  Lidoderm patches can be purchased over the counter  Continue robaxin as prescribed

## 2020-03-09 NOTE — ED ATTENDING ATTESTATION
3/8/2020  IGt MD, saw and evaluated the patient  I have discussed the patient with the resident/non-physician practitioner and agree with the resident's/non-physician practitioner's findings, Plan of Care, and MDM as documented in the resident's/non-physician practitioner's note, except where noted  All available labs and Radiology studies were reviewed  I was present for key portions of any procedure(s) performed by the resident/non-physician practitioner and I was immediately available to provide assistance  At this point I agree with the current assessment done in the Emergency Department  I have conducted an independent evaluation of this patient a history and physical is as follows:    ED Course         Critical Care Time  Procedures     29 yo male with right posterior neck pain since Friday, pt has been lifting furniture that day  No numbness, tingling, weakness  No headache  Pt pain worse with movement  Pt took advil  No pmh  Vss, afebrile, lungs cta, rrr, abdomen soft nontender, right trapezius tenderness, nvi  Muscle strain  Pain meds

## 2022-11-12 ENCOUNTER — APPOINTMENT (EMERGENCY)
Dept: RADIOLOGY | Facility: HOSPITAL | Age: 27
End: 2022-11-12

## 2022-11-12 ENCOUNTER — APPOINTMENT (EMERGENCY)
Dept: CT IMAGING | Facility: HOSPITAL | Age: 27
End: 2022-11-12

## 2022-11-12 ENCOUNTER — HOSPITAL ENCOUNTER (EMERGENCY)
Facility: HOSPITAL | Age: 27
Discharge: HOME | End: 2022-11-12
Attending: INTERNAL MEDICINE

## 2022-11-12 VITALS
HEART RATE: 58 BPM | TEMPERATURE: 98.2 F | WEIGHT: 156.09 LBS | RESPIRATION RATE: 20 BRPM | DIASTOLIC BLOOD PRESSURE: 67 MMHG | SYSTOLIC BLOOD PRESSURE: 118 MMHG | BODY MASS INDEX: 25.19 KG/M2 | OXYGEN SATURATION: 98 %

## 2022-11-12 DIAGNOSIS — R10.9 ABDOMINAL PAIN: ICD-10-CM

## 2022-11-12 DIAGNOSIS — E80.6 HYPERBILIRUBINEMIA: ICD-10-CM

## 2022-11-12 DIAGNOSIS — R07.9 CHEST PAIN: Primary | ICD-10-CM

## 2022-11-12 LAB
ALBUMIN SERPL BCP-MCNC: 4.4 G/DL (ref 3.5–5)
ALP SERPL-CCNC: 62 U/L (ref 46–116)
ALT SERPL W P-5'-P-CCNC: 22 U/L (ref 12–78)
ANION GAP SERPL CALCULATED.3IONS-SCNC: 5 MMOL/L (ref 4–13)
AST SERPL W P-5'-P-CCNC: 15 U/L (ref 5–45)
ATRIAL RATE: 71 BPM
BASOPHILS # BLD AUTO: 0.05 THOUSANDS/ÂΜL (ref 0–0.1)
BASOPHILS NFR BLD AUTO: 1 % (ref 0–1)
BILIRUB SERPL-MCNC: 2.2 MG/DL (ref 0.2–1)
BUN SERPL-MCNC: 11 MG/DL (ref 5–25)
CALCIUM SERPL-MCNC: 8.8 MG/DL (ref 8.3–10.1)
CARDIAC TROPONIN I PNL SERPL HS: <2 NG/L
CARDIAC TROPONIN I PNL SERPL HS: <2 NG/L
CHLORIDE SERPL-SCNC: 106 MMOL/L (ref 96–108)
CO2 SERPL-SCNC: 30 MMOL/L (ref 21–32)
CREAT SERPL-MCNC: 0.8 MG/DL (ref 0.6–1.3)
EOSINOPHIL # BLD AUTO: 0.03 THOUSAND/ÂΜL (ref 0–0.61)
EOSINOPHIL NFR BLD AUTO: 1 % (ref 0–6)
ERYTHROCYTE [DISTWIDTH] IN BLOOD BY AUTOMATED COUNT: 11.3 % (ref 11.6–15.1)
GFR SERPL CREATININE-BSD FRML MDRD: 120 ML/MIN/1.73SQ M
GLUCOSE SERPL-MCNC: 96 MG/DL (ref 65–140)
HCT VFR BLD AUTO: 41.1 % (ref 36.5–49.3)
HGB BLD-MCNC: 14 G/DL (ref 12–17)
IMM GRANULOCYTES # BLD AUTO: 0.01 THOUSAND/UL (ref 0–0.2)
IMM GRANULOCYTES NFR BLD AUTO: 0 % (ref 0–2)
LIPASE SERPL-CCNC: 49 U/L (ref 73–393)
LYMPHOCYTES # BLD AUTO: 1.59 THOUSANDS/ÂΜL (ref 0.6–4.47)
LYMPHOCYTES NFR BLD AUTO: 30 % (ref 14–44)
MCH RBC QN AUTO: 34.2 PG (ref 26.8–34.3)
MCHC RBC AUTO-ENTMCNC: 34.1 G/DL (ref 31.4–37.4)
MCV RBC AUTO: 101 FL (ref 82–98)
MONOCYTES # BLD AUTO: 0.43 THOUSAND/ÂΜL (ref 0.17–1.22)
MONOCYTES NFR BLD AUTO: 8 % (ref 4–12)
NEUTROPHILS # BLD AUTO: 3.15 THOUSANDS/ÂΜL (ref 1.85–7.62)
NEUTS SEG NFR BLD AUTO: 60 % (ref 43–75)
NRBC BLD AUTO-RTO: 0 /100 WBCS
P AXIS: 86 DEGREES
PLATELET # BLD AUTO: 238 THOUSANDS/UL (ref 149–390)
PMV BLD AUTO: 9.5 FL (ref 8.9–12.7)
POTASSIUM SERPL-SCNC: 4.6 MMOL/L (ref 3.5–5.3)
PR INTERVAL: 124 MS
QRS AXIS: 77 DEGREES
QRSD INTERVAL: 82 MS
QT INTERVAL: 372 MS
QTC INTERVAL: 404 MS
RBC # BLD AUTO: 4.09 MILLION/UL (ref 3.88–5.62)
SODIUM SERPL-SCNC: 141 MMOL/L (ref 135–147)
T WAVE AXIS: 70 DEGREES
VENTRICULAR RATE: 71 BPM
WBC # BLD AUTO: 5.26 THOUSAND/UL (ref 4.31–10.16)

## 2022-11-12 RX ORDER — KETOROLAC TROMETHAMINE 30 MG/ML
15 INJECTION, SOLUTION INTRAMUSCULAR; INTRAVENOUS ONCE
Status: COMPLETED | OUTPATIENT
Start: 2022-11-12 | End: 2022-11-12

## 2022-11-12 RX ORDER — SODIUM CHLORIDE 9 MG/ML
3 INJECTION INTRAVENOUS
Status: DISCONTINUED | OUTPATIENT
Start: 2022-11-12 | End: 2022-11-12 | Stop reason: HOSPADM

## 2022-11-12 RX ORDER — LIDOCAINE 50 MG/G
1 PATCH TOPICAL ONCE
Status: DISCONTINUED | OUTPATIENT
Start: 2022-11-12 | End: 2022-11-12 | Stop reason: HOSPADM

## 2022-11-12 RX ADMIN — IOHEXOL 100 ML: 350 INJECTION, SOLUTION INTRAVENOUS at 15:47

## 2022-11-12 RX ADMIN — KETOROLAC TROMETHAMINE 15 MG: 30 INJECTION, SOLUTION INTRAMUSCULAR; INTRAVENOUS at 13:32

## 2022-11-12 RX ADMIN — LIDOCAINE 1 PATCH: 50 PATCH CUTANEOUS at 13:32

## 2022-11-12 NOTE — ED PROVIDER NOTES
History  Chief Complaint   Patient presents with   • Chest Pain     Pt reports chest pain x2 months, woke up this morning with "pins and needles on right side of body " Also reports bloody stools  HPI  25-year-old man with no significant past medical history presents to ED for multiple complaints  He reports chest pain for 2 months  He reports pins and needles sensation over the right side of the body intermittent for weeks  He reports intermittent bloody stools for weeks  He reports right sided abdominal pain  It is dull  It does not radiate  He reports normal bowel movements for the last few days  Currently his only complaint is chest pain  He states the chest pain is over his left chest   It does not radiate  It is sharp  It is worse with certain movements  The patient denies association with alcohol use, vomiting, eating or pain radiating to the neck, arms, abdomen, or back  He denies dyspnea, nausea, syncope/presyncope, hypoxia, unilateral leg swelling or tenderness, history or risk factors for blood clots (smoking, exogenous estrogen, recent surgery or immobility, trauma)  The patient has no history of heart or lung disease  Patient does not have a primary care physician  Patient reports concern over having liver pathology, he reports his uncle and grandmother both  from liver cancer last year      Prior to Admission Medications   Prescriptions Last Dose Informant Patient Reported? Taking? methocarbamol (ROBAXIN) 500 mg tablet Not Taking at Unknown time  No No   Sig: Take 3 tablets (1,500 mg total) by mouth 3 (three) times a day   Patient not taking: Reported on 2022      Facility-Administered Medications: None       History reviewed  No pertinent past medical history  Past Surgical History:   Procedure Laterality Date   • APPENDECTOMY         History reviewed  No pertinent family history  I have reviewed and agree with the history as documented      E-Cigarette/Vaping   • E-Cigarette Use Never User      E-Cigarette/Vaping Substances     Social History     Tobacco Use   • Smoking status: Current Every Day Smoker     Packs/day: 0 20   • Smokeless tobacco: Never Used   Vaping Use   • Vaping Use: Never used   Substance Use Topics   • Alcohol use: Not Currently     Comment: weekends   • Drug use: No       Review of Systems   All other systems reviewed and are negative  Physical Exam  Physical Exam  Vitals and nursing note reviewed  Constitutional:       Appearance: He is well-developed  HENT:      Head: Normocephalic and atraumatic  Eyes:      Conjunctiva/sclera: Conjunctivae normal    Cardiovascular:      Rate and Rhythm: Normal rate and regular rhythm  Heart sounds: No murmur heard  Comments: Tenderness to palpation over left anterior chest wall  Pulmonary:      Effort: Pulmonary effort is normal  No respiratory distress  Breath sounds: Normal breath sounds  Abdominal:      Palpations: Abdomen is soft  Tenderness: There is no abdominal tenderness  There is no guarding or rebound  Musculoskeletal:      Cervical back: Neck supple  Skin:     General: Skin is warm and dry  Neurological:      Mental Status: He is alert           Vital Signs  ED Triage Vitals   Temperature Pulse Respirations Blood Pressure SpO2   11/12/22 1251 11/12/22 1251 11/12/22 1251 11/12/22 1251 11/12/22 1251   98 2 °F (36 8 °C) 60 16 145/67 100 %      Temp Source Heart Rate Source Patient Position - Orthostatic VS BP Location FiO2 (%)   11/12/22 1251 11/12/22 1251 11/12/22 1251 11/12/22 1251 --   Oral Monitor Sitting Right arm       Pain Score       11/12/22 1332       7           Vitals:    11/12/22 1251 11/12/22 1515   BP: 145/67 118/67   Pulse: 60 58   Patient Position - Orthostatic VS: Sitting Lying         Visual Acuity      ED Medications  Medications   sodium chloride (PF) 0 9 % injection 3 mL (has no administration in time range)   lidocaine (LIDODERM) 5 % patch 1 patch (1 patch Topical Medication Applied 11/12/22 1332)   ketorolac (TORADOL) injection 15 mg (15 mg Intravenous Given 11/12/22 1332)   iohexol (OMNIPAQUE) 350 MG/ML injection (SINGLE-DOSE) 100 mL (100 mL Intravenous Given 11/12/22 1547)       Diagnostic Studies  Results Reviewed     Procedure Component Value Units Date/Time    HS Troponin I 2hr [[id number]] Collected: 11/12/22 1531    Lab Status: Final result Specimen: Blood from Arm, Left Updated: 11/12/22 1557     hs TnI 2hr <2 ng/L      Delta 2hr hsTnI --    HS Troponin I 4hr [[id number]]     Lab Status: No result Specimen: Blood     Comprehensive metabolic panel [[id number]]  (Abnormal) Collected: 11/12/22 1331    Lab Status: Final result Specimen: Blood from Arm, Left Updated: 11/12/22 1407     Sodium 141 mmol/L      Potassium 4 6 mmol/L      Chloride 106 mmol/L      CO2 30 mmol/L      ANION GAP 5 mmol/L      BUN 11 mg/dL      Creatinine 0 80 mg/dL      Glucose 96 mg/dL      Calcium 8 8 mg/dL      AST 15 U/L      ALT 22 U/L      Alkaline Phosphatase 62 U/L      Total Protein --     Albumin 4 4 g/dL      Total Bilirubin 2 20 mg/dL      eGFR 120 ml/min/1 73sq m     Narrative:      [place] guidelines for Chronic Kidney Disease (CKD):   •  Stage 1 with normal or high GFR (GFR > 90 mL/min/1 73 square meters)  •  Stage 2 Mild CKD (GFR = 60-89 mL/min/1 73 square meters)  •  Stage 3A Moderate CKD (GFR = 45-59 mL/min/1 73 square meters)  •  Stage 3B Moderate CKD (GFR = 30-44 mL/min/1 73 square meters)  •  Stage 4 Severe CKD (GFR = 15-29 mL/min/1 73 square meters)  •  Stage 5 End Stage CKD (GFR <15 mL/min/1 73 square meters)  Note: GFR calculation is accurate only with a steady state creatinine    HS Troponin 0hr (reflex protocol) [[id number]]  (Normal) Collected: 11/12/22 1331    Lab Status: Final result Specimen: Blood from Arm, Left Updated: 11/12/22 1404     hs TnI 0hr <2 ng/L     Lipase [[id number]]  (Abnormal) Collected: 11/12/22 1661    Lab Status: Final result Specimen: Blood from Arm, Left Updated: 11/12/22 1403     Lipase 49 u/L     CBC and differential [[id number]]  (Abnormal) Collected: 11/12/22 1331    Lab Status: Final result Specimen: Blood from Arm, Left Updated: 11/12/22 1338     WBC 5 26 Thousand/uL      RBC 4 09 Million/uL      Hemoglobin 14 0 g/dL      Hematocrit 41 1 %       fL      MCH 34 2 pg      MCHC 34 1 g/dL      RDW 11 3 %      MPV 9 5 fL      Platelets 903 Thousands/uL      nRBC 0 /100 WBCs      Neutrophils Relative 60 %      Immat GRANS % 0 %      Lymphocytes Relative 30 %      Monocytes Relative 8 %      Eosinophils Relative 1 %      Basophils Relative 1 %      Neutrophils Absolute 3 15 Thousands/µL      Immature Grans Absolute 0 01 Thousand/uL      Lymphocytes Absolute 1 59 Thousands/µL      Monocytes Absolute 0 43 Thousand/µL      Eosinophils Absolute 0 03 Thousand/µL      Basophils Absolute 0 05 Thousands/µL                  CT abdomen pelvis with contrast   Final Result by Daljit De Jesus MD (11/12 1609)      No acute CT finding in the abdomen or pelvis  Workstation performed: [id number]         X-ray chest 1 view portable   ED Interpretation by Gisell Goodman MD (11/12 1405)   No acute cardiopulmonary process                 Procedures  Procedures         ED Course  ED Course as of 11/12/22 1645   Sat Nov 12, 2022   1359 EKG: normal EKG, normal sinus rhythm, unchanged from previous tracings               HEART Risk Score    Flowsheet Row Most Recent Value   Heart Score Risk Calculator    History 0 Filed at: 11/12/2022 1644   ECG 0 Filed at: 11/12/2022 1644   Age 0 Filed at: 11/12/2022 1644   Risk Factors 0 Filed at: 11/12/2022 1644   Troponin 0 Filed at: 11/12/2022 1644   HEART Score 0 Filed at: 11/12/2022 1644                        SBIRT 20yo+    Flowsheet Row Most Recent Value   SBIRT (25 yo +)    In order to provide better care to our patients, we are screening all of our patients for alcohol and drug use  Would it be okay to ask you these screening questions? Unable to answer at this time Filed at: 11/12/2022 1323                    MDM  Number of Diagnoses or Management Options  Abdominal pain  Chest pain  Hyperbilirubinemia  Diagnosis management comments: Workup significant for hyperbilirubinemia  Discussed importance of having this checked  Patient reported understanding will follow up with his PCP  Disposition  Final diagnoses:   Chest pain   Abdominal pain   Hyperbilirubinemia     Time reflects when diagnosis was documented in both MDM as applicable and the Disposition within this note     Time User Action Codes Description Comment    11/12/2022  4:32 PM Mauricio Eaves Add [R07 9] Chest pain     11/12/2022  4:32 PM Hurtis Bilis [R10 9] Abdominal pain     11/12/2022  4:32 PM Mauricio Eaves Add [E80 6] Hyperbilirubinemia       ED Disposition     ED Disposition   Discharge    Condition   Stable    Date/Time   Sat Nov 12, 2022  4:32 PM    Comment   Romana Segura discharge to home/self care  Follow-up Information     Follow up With Specialties Details Why Contact Info    Torri Trivedi MD Family Medicine Schedule an appointment as soon as possible for a visit in 2 weeks Recheck bilirubin [address]  [address] [place] [address]   [phone]            Patient's Medications   Discharge Prescriptions    No medications on file       No discharge procedures on file      PDMP Review     None          ED Provider  Electronically Signed by           Mere Comer MD  11/12/22 4770

## 2022-11-12 NOTE — DISCHARGE INSTRUCTIONS
CT abdomen pelvis with contrast    Result Date: 11/12/2022  Impression: No acute CT finding in the abdomen or pelvis   Workstation performed: [id number]

## 2022-11-12 NOTE — ED NOTES
Patient transported to [address] [place] [address],[address], [address] [facility]  11/12/22 3485

## 2022-11-13 LAB
ATRIAL RATE: 55 BPM
P AXIS: 69 DEGREES
PR INTERVAL: 132 MS
QRS AXIS: 77 DEGREES
QRSD INTERVAL: 74 MS
QT INTERVAL: 434 MS
QTC INTERVAL: 415 MS
T WAVE AXIS: 68 DEGREES
VENTRICULAR RATE: 55 BPM

## 2023-09-16 ENCOUNTER — HOSPITAL ENCOUNTER (EMERGENCY)
Facility: HOSPITAL | Age: 28
Discharge: HOME | End: 2023-09-16
Attending: INTERNAL MEDICINE

## 2023-09-16 VITALS
DIASTOLIC BLOOD PRESSURE: 81 MMHG | OXYGEN SATURATION: 99 % | WEIGHT: 153.2 LBS | HEART RATE: 77 BPM | SYSTOLIC BLOOD PRESSURE: 141 MMHG | BODY MASS INDEX: 24.73 KG/M2 | TEMPERATURE: 98.5 F | RESPIRATION RATE: 20 BRPM

## 2023-09-16 DIAGNOSIS — S61.311A LACERATION OF LEFT INDEX FINGER WITHOUT FOREIGN BODY WITH DAMAGE TO NAIL, INITIAL ENCOUNTER: Primary | ICD-10-CM

## 2023-09-16 PROCEDURE — 90471 IMMUNIZATION ADMIN: CPT

## 2023-09-16 PROCEDURE — 99284 EMERGENCY DEPT VISIT MOD MDM: CPT | Performed by: PHYSICIAN ASSISTANT

## 2023-09-16 PROCEDURE — 90715 TDAP VACCINE 7 YRS/> IM: CPT | Performed by: PHYSICIAN ASSISTANT

## 2023-09-16 PROCEDURE — 99282 EMERGENCY DEPT VISIT SF MDM: CPT

## 2023-09-16 PROCEDURE — 12042 INTMD RPR N-HF/GENIT2.6-7.5: CPT | Performed by: PHYSICIAN ASSISTANT

## 2023-09-16 RX ORDER — BACITRACIN, NEOMYCIN, POLYMYXIN B 400; 3.5; 5 [USP'U]/G; MG/G; [USP'U]/G
1 OINTMENT TOPICAL ONCE
Status: COMPLETED | OUTPATIENT
Start: 2023-09-16 | End: 2023-09-16

## 2023-09-16 RX ORDER — LIDOCAINE HYDROCHLORIDE 10 MG/ML
10 INJECTION, SOLUTION EPIDURAL; INFILTRATION; INTRACAUDAL; PERINEURAL ONCE
Status: COMPLETED | OUTPATIENT
Start: 2023-09-16 | End: 2023-09-16

## 2023-09-16 RX ADMIN — TETANUS TOXOID, REDUCED DIPHTHERIA TOXOID AND ACELLULAR PERTUSSIS VACCINE, ADSORBED 0.5 ML: 5; 2.5; 8; 8; 2.5 SUSPENSION INTRAMUSCULAR at 12:24

## 2023-09-16 RX ADMIN — LIDOCAINE HYDROCHLORIDE 10 ML: 10 INJECTION, SOLUTION EPIDURAL; INFILTRATION; INTRACAUDAL; PERINEURAL at 12:23

## 2023-09-16 RX ADMIN — BACITRACIN ZINC, NEOMYCIN, POLYMYXIN B SULFAT 1 SMALL APPLICATION: 5000; 3.5; 4 OINTMENT TOPICAL at 12:24

## 2023-09-16 NOTE — ED PROVIDER NOTES
History  Chief Complaint   Patient presents with   • Laceration     Cut left hand 1st finger and nail with . Not sure when last tetanus was      68-year-old right-handed male presents for evaluation of laceration to his left first finger with a  earlier today patient unsure of his last tetanus vaccination. Patient reports no sensory changes and no range of motion changes. Patient denies the potential for foreign bodies as he states he cut the finger with a intact . History provided by:  Patient   used: No    Laceration  Location:  Finger  Associated symptoms: no fever and no rash        Prior to Admission Medications   Prescriptions Last Dose Informant Patient Reported? Taking? methocarbamol (ROBAXIN) 500 mg tablet   No No   Sig: Take 3 tablets (1,500 mg total) by mouth 3 (three) times a day   Patient not taking: Reported on 11/12/2022      Facility-Administered Medications: None       History reviewed. No pertinent past medical history. Past Surgical History:   Procedure Laterality Date   • APPENDECTOMY         History reviewed. No pertinent family history. I have reviewed and agree with the history as documented. E-Cigarette/Vaping   • E-Cigarette Use Never User      E-Cigarette/Vaping Substances     Social History     Tobacco Use   • Smoking status: Every Day     Packs/day: 0.20     Types: Cigarettes   • Smokeless tobacco: Never   Vaping Use   • Vaping Use: Never used   Substance Use Topics   • Alcohol use: Not Currently     Comment: weekends   • Drug use: No       Review of Systems   Constitutional: Negative for chills, fatigue and fever. HENT: Negative for congestion, ear pain, rhinorrhea and sore throat. Eyes: Negative for redness. Respiratory: Negative for chest tightness and shortness of breath. Cardiovascular: Negative for chest pain and palpitations. Gastrointestinal: Negative for abdominal pain, nausea and vomiting. Genitourinary: Negative for dysuria and hematuria. Musculoskeletal: Negative. Skin: Positive for wound. Negative for rash. Neurological: Negative for dizziness, syncope, light-headedness and numbness. Physical Exam  Physical Exam  Vitals and nursing note reviewed. Constitutional:       Appearance: Normal appearance. He is well-developed. HENT:      Head: Normocephalic and atraumatic. Eyes:      General: No scleral icterus. Pupils: Pupils are equal, round, and reactive to light. Cardiovascular:      Rate and Rhythm: Normal rate and regular rhythm. Pulses: Normal pulses. Pulmonary:      Effort: Pulmonary effort is normal. No respiratory distress. Breath sounds: No stridor. Abdominal:      General: There is no distension. Palpations: There is no mass. Musculoskeletal:        Hands:       Cervical back: Normal range of motion. Skin:     General: Skin is warm and dry. Capillary Refill: Capillary refill takes less than 2 seconds. Coloration: Skin is not jaundiced. Neurological:      Mental Status: He is alert and oriented to person, place, and time.       Gait: Gait normal.   Psychiatric:         Mood and Affect: Mood normal.         Vital Signs  ED Triage Vitals [09/16/23 1201]   Temperature Pulse Respirations Blood Pressure SpO2   98.5 °F (36.9 °C) 77 20 141/81 99 %      Temp Source Heart Rate Source Patient Position - Orthostatic VS BP Location FiO2 (%)   Oral Monitor Lying Left arm --      Pain Score       --           Vitals:    09/16/23 1201   BP: 141/81   Pulse: 77   Patient Position - Orthostatic VS: Lying         Visual Acuity      ED Medications  Medications   lidocaine (PF) (XYLOCAINE-MPF) 1 % injection 10 mL (10 mL Infiltration Given by Other 9/16/23 1223)   tetanus-diphtheria-acellular pertussis (BOOSTRIX) IM injection 0.5 mL (0.5 mL Intramuscular Given 9/16/23 1224)   neomycin-bacitracin-polymyxin b (NEOSPORIN) ointment 1 small application (1 small application Topical Given 9/16/23 1224)       Diagnostic Studies  Results Reviewed     None                 No orders to display              Procedures  Universal Protocol:  Consent: Verbal consent obtained. Consent given by: patient  Patient understanding: patient states understanding of the procedure being performed  Patient identity confirmed: verbally with patient    Laceration repair    Date/Time: 9/16/2023 1:14 PM    Performed by: Hawk Parker PA-C  Authorized by: Hawk Parker PA-C  Body area: upper extremity  Location details: left index finger  Laceration length: 3 cm  Tendon involvement: none  Nerve involvement: none  Vascular damage: no  Anesthesia: digital block    Anesthesia:  Local Anesthetic: lidocaine 1% without epinephrine    Sedation:  Patient sedated: no      Wound Dehiscence:  Superficial Wound Dehiscence: simple closure      Procedure Details:  Irrigation solution: saline  Irrigation method: syringe  Amount of cleaning: extensive  Debridement: none  Degree of undermining: none  Skin closure: 5-0 nylon  Number of sutures: 3  Technique: simple  Approximation: close  Approximation difficulty: simple  Dressing: 4x4 sterile gauze and antibiotic ointment  Cleaning details: dirt and other organic matter             ED Course  ED Course as of 09/16/23 1437   Sat Sep 16, 2023   1314 3 sutures placed in left index finger. 5-0. Patient was reexamined at this time and informed of laboratory and/or imaging results and was found to be stable for discharge. Return to emergency department criteria was reviewed with the patient who verbalized understanding and was agreeable to discharge and the treatment plan at this time. SBIRT 22yo+    Flowsheet Row Most Recent Value   Initial Alcohol Screen: US AUDIT-C     1. How often do you have a drink containing alcohol? 0 Filed at: 09/16/2023 1203   2.  How many drinks containing alcohol do you have on a typical day you are drinking? 0 Filed at: 09/16/2023 1203   3a. Male UNDER 65: How often do you have five or more drinks on one occasion? 0 Filed at: 09/16/2023 1203   3b. FEMALE Any Age, or MALE 65+: How often do you have 4 or more drinks on one occassion? 0 Filed at: 09/16/2023 1203   Audit-C Score 0 Filed at: 09/16/2023 1203   RUBY: How many times in the past year have you. .. Used an illegal drug or used a prescription medication for non-medical reasons? Never Filed at: 09/16/2023 1203                    Medical Decision Making  15-year-old male presenting for evaluation of laceration to the left index finger patient is right-handed reports he was cutting with a  accidentally cut his finger. 3 cm laceration easily approximated via 5- 0 nylon sutures see procedure note. Laceration does not violate the nail and nailbed however does not demonstrate significant dehiscence shared decision-making discussion was had with the patient in regards to placement of nailbed sutures, patient is agreeable to avoid the sutures in the setting of concerns for significant pain as well as patient's ability to stay out of work for the next week to allow for nail and nailbed healing with a clean and dry cover. Laceration to distal aspect of dorsal hand was approximated well with sutures. Tetanus vaccine updated    All imaging and/or lab testing discussed with patient, strict return to ED precautions discussed. Patient and/or family members verbalizes understanding and agrees with plan. Patient is stable for discharge     Portions of the record may have been created with voice recognition software. Occasional wrong word or "sound a like" substitutions may have occurred due to the inherent limitations of voice recognition software. Read the chart carefully and recognize, using context, where substitutions have occurred. Risk  OTC drugs. Prescription drug management.           Disposition  Final diagnoses:   Laceration of left index finger without foreign body with damage to nail, initial encounter     Time reflects when diagnosis was documented in both MDM as applicable and the Disposition within this note     Time User Action Codes Description Comment    9/16/2023  1:14 PM Doug Plummer [W65.132V] Laceration of left index finger without foreign body with damage to nail, initial encounter       ED Disposition     ED Disposition   Discharge    Condition   Good    Date/Time   Sat Sep 16, 2023  1:14 PM    [address] discharge to home/self care. Follow-up Information     Follow up With Specialties Details Why Contact Info Additional Information    [facility] Emergency Department Emergency Medicine In 2 weeks For suture removal [address] [id number]  [facility] Emergency Department          Discharge Medication List as of 9/16/2023  1:14 PM      CONTINUE these medications which have NOT CHANGED    Details   methocarbamol (ROBAXIN) 500 mg tablet Take 3 tablets (1,500 mg total) by mouth 3 (three) times a day, Starting Sun 3/8/2020, Normal             No discharge procedures on file.     PDMP Review     None          ED Provider  Electronically Signed by           Emilee Devlin PA-C  09/16/23 6162

## 2024-09-23 ENCOUNTER — APPOINTMENT (OUTPATIENT)
Dept: URGENT CARE | Facility: MEDICAL CENTER | Age: 29
End: 2024-09-23